# Patient Record
Sex: FEMALE | Race: WHITE | Employment: OTHER | URBAN - METROPOLITAN AREA
[De-identification: names, ages, dates, MRNs, and addresses within clinical notes are randomized per-mention and may not be internally consistent; named-entity substitution may affect disease eponyms.]

---

## 2017-04-08 ENCOUNTER — APPOINTMENT (OUTPATIENT)
Dept: CT IMAGING | Facility: HOSPITAL | Age: 72
End: 2017-04-08
Attending: EMERGENCY MEDICINE
Payer: MEDICARE

## 2017-04-08 ENCOUNTER — HOSPITAL ENCOUNTER (EMERGENCY)
Facility: HOSPITAL | Age: 72
Discharge: HOME | End: 2017-04-08
Attending: EMERGENCY MEDICINE
Payer: MEDICARE

## 2017-04-08 VITALS
HEART RATE: 78 BPM | RESPIRATION RATE: 18 BRPM | BODY MASS INDEX: 24.45 KG/M2 | TEMPERATURE: 97 F | SYSTOLIC BLOOD PRESSURE: 149 MMHG | WEIGHT: 138 LBS | HEIGHT: 63 IN | OXYGEN SATURATION: 98 % | DIASTOLIC BLOOD PRESSURE: 88 MMHG

## 2017-04-08 DIAGNOSIS — S09.90XA HEAD INJURY, INITIAL ENCOUNTER: ICD-10-CM

## 2017-04-08 DIAGNOSIS — S06.0X0A CONCUSSION, WITHOUT LOC, INITIAL ENCOUNTER: Primary | ICD-10-CM

## 2017-04-08 DIAGNOSIS — S01.01XA SCALP LACERATION, INITIAL ENCOUNTER: ICD-10-CM

## 2017-04-08 PROCEDURE — 99284 EMERGENCY DEPT VISIT MOD MDM: CPT

## 2017-04-08 PROCEDURE — 93010 ELECTROCARDIOGRAM REPORT: CPT

## 2017-04-08 PROCEDURE — 70450 CT HEAD/BRAIN W/O DYE: CPT

## 2017-04-08 PROCEDURE — 96374 THER/PROPH/DIAG INJ IV PUSH: CPT

## 2017-04-08 PROCEDURE — 93005 ELECTROCARDIOGRAM TRACING: CPT

## 2017-04-08 PROCEDURE — 96361 HYDRATE IV INFUSION ADD-ON: CPT

## 2017-04-08 RX ORDER — ONDANSETRON 4 MG/1
4 TABLET, ORALLY DISINTEGRATING ORAL EVERY 4 HOURS PRN
Qty: 10 TABLET | Refills: 0 | Status: SHIPPED | OUTPATIENT
Start: 2017-04-08 | End: 2017-04-15

## 2017-04-08 RX ORDER — ONDANSETRON 2 MG/ML
4 INJECTION INTRAMUSCULAR; INTRAVENOUS ONCE
Status: COMPLETED | OUTPATIENT
Start: 2017-04-08 | End: 2017-04-08

## 2017-04-08 NOTE — ED INITIAL ASSESSMENT (HPI)
Pt states she was kneeling on the counters washing the windows when she lost her balance and fell backwards onto the hardwood floor. Pt had no loss of conc reported but was complaining of nausea.   Pt did vomit once prior to arrival and states nausea has s

## 2017-04-08 NOTE — ED PROVIDER NOTES
Patient Seen in: [facility] Emergency Department    History   Patient presents with:  Fall (musculoskeletal, neurologic)    Stated Complaint: Fall    HPI    69-year-old female presents with a head injury.   Patient states she was standing on a kitche other systems reviewed and negative except as noted above. PSFH elements reviewed from today and agreed except as otherwise stated in HPI.     Physical Exam       ED Triage Vitals   BP 04/08/17 1450 140/83 mmHg   Pulse 04/08/17 1450 78   Resp 04/08/17 14 reduction techniques were used. Dose information is transmitted to the [facility] [employer] [facility]) NRDR ([address]) which includes the Dose Index Registry.   PATIENT STATED HISTORY: (As transcribed by Technologist)  The patien should she continue to have nausea or vomiting. Symptoms consistent with concussion. To follow-up with primary care physician. Posterior scalp laceration is superficial and does not require surgical reapproximation.   Patient instructed to return to the

## 2017-04-08 NOTE — ED NOTES
D/c instructions and script given to pt, no distress, pt requires wheelchair out of ED, thanks staff for care.

## 2017-04-08 NOTE — ED NOTES
Cervical collar removed by Dr. Adriana Beavers. Pt has no change in neuro symptoms or assessment. Pt has a 1cm laceration noted to the left lower occipital area. Pt was reporting having nausea and dizziness when sitting forward. Pt did vomit once.   And after l

## 2017-04-08 NOTE — ED NOTES
Pt received at this time awake and alert. Pt currently is denying nausea or dizziness but vomited once in the ambulance. Pt awake and alert, oriented x3. Pt remembers the event. Pt warm and dry to touch with normal coloration noted.   Pt respirations

## 2017-07-25 PROBLEM — Z28.21 REFUSED STREPTOCOCCUS PNEUMONIAE VACCINATION: Status: ACTIVE | Noted: 2017-07-25

## 2017-10-12 ENCOUNTER — HOSPITAL ENCOUNTER (OUTPATIENT)
Dept: CT IMAGING | Facility: HOSPITAL | Age: 72
Discharge: HOME | End: 2017-10-12
Attending: NURSE PRACTITIONER
Payer: MEDICARE

## 2017-10-12 DIAGNOSIS — R10.32 LLQ PAIN: ICD-10-CM

## 2017-10-12 DIAGNOSIS — R10.31 RLQ ABDOMINAL PAIN: ICD-10-CM

## 2017-10-12 PROCEDURE — 74176 CT ABD & PELVIS W/O CONTRAST: CPT | Performed by: NURSE PRACTITIONER

## 2018-01-04 ENCOUNTER — HOSPITAL ENCOUNTER (OUTPATIENT)
Dept: GENERAL RADIOLOGY | Facility: HOSPITAL | Age: 73
Discharge: HOME | End: 2018-01-04
Attending: INTERNAL MEDICINE
Payer: MEDICARE

## 2018-01-04 DIAGNOSIS — R10.32 ABDOMINAL PAIN, LEFT LOWER QUADRANT: ICD-10-CM

## 2018-01-04 PROCEDURE — 74280 X-RAY XM COLON 2CNTRST STD: CPT | Performed by: INTERNAL MEDICINE

## 2018-01-11 RX ORDER — MULTIVIT WITH MINERALS/LUTEIN
250 TABLET ORAL DAILY
COMMUNITY
End: 2019-05-14

## 2018-01-11 RX ORDER — SODIUM CHLORIDE, SODIUM LACTATE, POTASSIUM CHLORIDE, CALCIUM CHLORIDE 600; 310; 30; 20 MG/100ML; MG/100ML; MG/100ML; MG/100ML
INJECTION, SOLUTION INTRAVENOUS CONTINUOUS
Status: CANCELLED | OUTPATIENT
Start: 2018-01-11

## 2018-01-25 PROBLEM — I49.3 UNIFOCAL PVCS: Status: ACTIVE | Noted: 2018-01-25

## 2018-01-25 PROBLEM — Z12.31 ENCOUNTER FOR SCREENING MAMMOGRAM FOR BREAST CANCER: Status: ACTIVE | Noted: 2018-01-25

## 2018-01-25 PROBLEM — Z79.899 ENCOUNTER FOR LONG-TERM CURRENT USE OF MEDICATION: Status: ACTIVE | Noted: 2018-01-25

## 2018-01-25 PROBLEM — Z28.21 REFUSED INFLUENZA VACCINE: Status: ACTIVE | Noted: 2018-01-25

## 2018-01-28 ENCOUNTER — ANESTHESIA EVENT (OUTPATIENT)
Dept: SURGERY | Facility: HOSPITAL | Age: 73
DRG: 330 | End: 2018-01-28
Payer: MEDICARE

## 2018-01-29 ENCOUNTER — ANESTHESIA (OUTPATIENT)
Dept: SURGERY | Facility: HOSPITAL | Age: 73
DRG: 330 | End: 2018-01-29
Payer: MEDICARE

## 2018-01-29 ENCOUNTER — SURGERY (OUTPATIENT)
Age: 73
End: 2018-01-29

## 2018-01-29 ENCOUNTER — HOSPITAL ENCOUNTER (INPATIENT)
Facility: HOSPITAL | Age: 73
LOS: 2 days | Discharge: HOME | DRG: 330 | End: 2018-01-31
Attending: SURGERY | Admitting: SURGERY
Payer: MEDICARE

## 2018-01-29 DIAGNOSIS — K56.699 STRICTURE OF SIGMOID COLON (HCC): ICD-10-CM

## 2018-01-29 PROCEDURE — 0DTN0ZZ RESECTION OF SIGMOID COLON, OPEN APPROACH: ICD-10-PCS | Performed by: SURGERY

## 2018-01-29 PROCEDURE — 88307 TISSUE EXAM BY PATHOLOGIST: CPT | Performed by: SURGERY

## 2018-01-29 PROCEDURE — 0DJD8ZZ INSPECTION OF LOWER INTESTINAL TRACT, VIA NATURAL OR ARTIFICIAL OPENING ENDOSCOPIC: ICD-10-PCS | Performed by: SURGERY

## 2018-01-29 RX ORDER — OXYCODONE HYDROCHLORIDE 5 MG/1
2.5 TABLET ORAL EVERY 4 HOURS PRN
Status: DISCONTINUED | OUTPATIENT
Start: 2018-01-29 | End: 2018-01-31

## 2018-01-29 RX ORDER — METOCLOPRAMIDE HYDROCHLORIDE 5 MG/ML
10 INJECTION INTRAMUSCULAR; INTRAVENOUS AS NEEDED
Status: DISCONTINUED | OUTPATIENT
Start: 2018-01-29 | End: 2018-01-29 | Stop reason: HOSPADM

## 2018-01-29 RX ORDER — LIDOCAINE HYDROCHLORIDE AND EPINEPHRINE 10; 10 MG/ML; UG/ML
INJECTION, SOLUTION INFILTRATION; PERINEURAL AS NEEDED
Status: DISCONTINUED | OUTPATIENT
Start: 2018-01-29 | End: 2018-01-29 | Stop reason: HOSPADM

## 2018-01-29 RX ORDER — NALOXONE HYDROCHLORIDE 0.4 MG/ML
80 INJECTION, SOLUTION INTRAMUSCULAR; INTRAVENOUS; SUBCUTANEOUS AS NEEDED
Status: DISCONTINUED | OUTPATIENT
Start: 2018-01-29 | End: 2018-01-29 | Stop reason: HOSPADM

## 2018-01-29 RX ORDER — KETOROLAC TROMETHAMINE 30 MG/ML
30 INJECTION, SOLUTION INTRAMUSCULAR; INTRAVENOUS EVERY 6 HOURS PRN
Status: DISCONTINUED | OUTPATIENT
Start: 2018-01-29 | End: 2018-01-29 | Stop reason: DRUGHIGH

## 2018-01-29 RX ORDER — FAMOTIDINE 20 MG/1
20 TABLET ORAL DAILY
Status: DISCONTINUED | OUTPATIENT
Start: 2018-01-29 | End: 2018-01-31

## 2018-01-29 RX ORDER — METRONIDAZOLE 500 MG/100ML
500 INJECTION, SOLUTION INTRAVENOUS ONCE
Status: DISCONTINUED | OUTPATIENT
Start: 2018-01-29 | End: 2018-01-29 | Stop reason: HOSPADM

## 2018-01-29 RX ORDER — MIDAZOLAM HYDROCHLORIDE 1 MG/ML
1 INJECTION INTRAMUSCULAR; INTRAVENOUS EVERY 5 MIN PRN
Status: DISCONTINUED | OUTPATIENT
Start: 2018-01-29 | End: 2018-01-29 | Stop reason: HOSPADM

## 2018-01-29 RX ORDER — POLYETHYLENE GLYCOL 3350 17 G/17G
1 POWDER, FOR SOLUTION ORAL DAILY PRN
Status: DISCONTINUED | OUTPATIENT
Start: 2018-01-29 | End: 2018-01-31

## 2018-01-29 RX ORDER — SODIUM CHLORIDE, SODIUM LACTATE, POTASSIUM CHLORIDE, CALCIUM CHLORIDE 600; 310; 30; 20 MG/100ML; MG/100ML; MG/100ML; MG/100ML
INJECTION, SOLUTION INTRAVENOUS CONTINUOUS
Status: DISCONTINUED | OUTPATIENT
Start: 2018-01-29 | End: 2018-01-29 | Stop reason: HOSPADM

## 2018-01-29 RX ORDER — ACETAMINOPHEN 325 MG/1
650 TABLET ORAL 4 TIMES DAILY
Status: DISCONTINUED | OUTPATIENT
Start: 2018-01-29 | End: 2018-01-31

## 2018-01-29 RX ORDER — IBUPROFEN 400 MG/1
400 TABLET ORAL EVERY 6 HOURS PRN
Status: DISCONTINUED | OUTPATIENT
Start: 2018-01-29 | End: 2018-01-31

## 2018-01-29 RX ORDER — OXYCODONE HYDROCHLORIDE 5 MG/1
10 TABLET ORAL EVERY 4 HOURS PRN
Status: DISCONTINUED | OUTPATIENT
Start: 2018-01-29 | End: 2018-01-31

## 2018-01-29 RX ORDER — SODIUM PHOSPHATE, DIBASIC AND SODIUM PHOSPHATE, MONOBASIC 7; 19 G/133ML; G/133ML
1 ENEMA RECTAL ONCE AS NEEDED
Status: DISCONTINUED | OUTPATIENT
Start: 2018-01-29 | End: 2018-01-29 | Stop reason: HOSPADM

## 2018-01-29 RX ORDER — SCOLOPAMINE TRANSDERMAL SYSTEM 1 MG/1
PATCH, EXTENDED RELEASE TRANSDERMAL
Status: DISCONTINUED
Start: 2018-01-29 | End: 2018-01-31

## 2018-01-29 RX ORDER — SCOLOPAMINE TRANSDERMAL SYSTEM 1 MG/1
1 PATCH, EXTENDED RELEASE TRANSDERMAL
Status: DISCONTINUED | OUTPATIENT
Start: 2018-01-29 | End: 2018-01-29 | Stop reason: HOSPADM

## 2018-01-29 RX ORDER — HYDROCODONE BITARTRATE AND ACETAMINOPHEN 5; 325 MG/1; MG/1
2 TABLET ORAL AS NEEDED
Status: DISCONTINUED | OUTPATIENT
Start: 2018-01-29 | End: 2018-01-29 | Stop reason: HOSPADM

## 2018-01-29 RX ORDER — MEPERIDINE HYDROCHLORIDE 25 MG/ML
12.5 INJECTION INTRAMUSCULAR; INTRAVENOUS; SUBCUTANEOUS AS NEEDED
Status: DISCONTINUED | OUTPATIENT
Start: 2018-01-29 | End: 2018-01-29 | Stop reason: HOSPADM

## 2018-01-29 RX ORDER — HEPARIN SODIUM 5000 [USP'U]/ML
5000 INJECTION, SOLUTION INTRAVENOUS; SUBCUTANEOUS EVERY 12 HOURS
Status: DISCONTINUED | OUTPATIENT
Start: 2018-01-29 | End: 2018-01-31

## 2018-01-29 RX ORDER — BACITRACIN 50000 [USP'U]/1
INJECTION, POWDER, LYOPHILIZED, FOR SOLUTION INTRAMUSCULAR AS NEEDED
Status: DISCONTINUED | OUTPATIENT
Start: 2018-01-29 | End: 2018-01-29 | Stop reason: HOSPADM

## 2018-01-29 RX ORDER — HYDROMORPHONE HYDROCHLORIDE 1 MG/ML
INJECTION, SOLUTION INTRAMUSCULAR; INTRAVENOUS; SUBCUTANEOUS
Status: COMPLETED
Start: 2018-01-29 | End: 2018-01-29

## 2018-01-29 RX ORDER — HYDROCODONE BITARTRATE AND ACETAMINOPHEN 5; 325 MG/1; MG/1
1 TABLET ORAL AS NEEDED
Status: DISCONTINUED | OUTPATIENT
Start: 2018-01-29 | End: 2018-01-29 | Stop reason: HOSPADM

## 2018-01-29 RX ORDER — IBUPROFEN 600 MG/1
600 TABLET ORAL EVERY 6 HOURS PRN
Status: DISCONTINUED | OUTPATIENT
Start: 2018-01-29 | End: 2018-01-31

## 2018-01-29 RX ORDER — ONDANSETRON 2 MG/ML
4 INJECTION INTRAMUSCULAR; INTRAVENOUS EVERY 6 HOURS PRN
Status: DISCONTINUED | OUTPATIENT
Start: 2018-01-29 | End: 2018-01-31

## 2018-01-29 RX ORDER — DIPHENHYDRAMINE HYDROCHLORIDE 50 MG/ML
12.5 INJECTION INTRAMUSCULAR; INTRAVENOUS AS NEEDED
Status: DISCONTINUED | OUTPATIENT
Start: 2018-01-29 | End: 2018-01-29 | Stop reason: HOSPADM

## 2018-01-29 RX ORDER — KETOROLAC TROMETHAMINE 15 MG/ML
15 INJECTION, SOLUTION INTRAMUSCULAR; INTRAVENOUS EVERY 6 HOURS PRN
Status: DISCONTINUED | OUTPATIENT
Start: 2018-01-29 | End: 2018-01-31

## 2018-01-29 RX ORDER — BUPIVACAINE HYDROCHLORIDE 5 MG/ML
INJECTION, SOLUTION EPIDURAL; INTRACAUDAL AS NEEDED
Status: DISCONTINUED | OUTPATIENT
Start: 2018-01-29 | End: 2018-01-29 | Stop reason: HOSPADM

## 2018-01-29 RX ORDER — METRONIDAZOLE 500 MG/100ML
INJECTION, SOLUTION INTRAVENOUS
Status: DISCONTINUED | OUTPATIENT
Start: 2018-01-29 | End: 2018-01-29

## 2018-01-29 RX ORDER — OXYCODONE HYDROCHLORIDE 5 MG/1
5 TABLET ORAL EVERY 4 HOURS PRN
Status: DISCONTINUED | OUTPATIENT
Start: 2018-01-29 | End: 2018-01-31

## 2018-01-29 RX ORDER — ACETAMINOPHEN 500 MG
1000 TABLET ORAL ONCE
Status: COMPLETED | OUTPATIENT
Start: 2018-01-29 | End: 2018-01-29

## 2018-01-29 RX ORDER — ZOLPIDEM TARTRATE 5 MG/1
5 TABLET ORAL NIGHTLY PRN
Status: DISCONTINUED | OUTPATIENT
Start: 2018-01-29 | End: 2018-01-31

## 2018-01-29 RX ORDER — FAMOTIDINE 10 MG/ML
20 INJECTION, SOLUTION INTRAVENOUS DAILY
Status: DISCONTINUED | OUTPATIENT
Start: 2018-01-29 | End: 2018-01-31

## 2018-01-29 RX ORDER — SODIUM CHLORIDE 9 MG/ML
INJECTION, SOLUTION INTRAVENOUS CONTINUOUS
Status: DISCONTINUED | OUTPATIENT
Start: 2018-01-29 | End: 2018-01-29

## 2018-01-29 RX ORDER — SODIUM CHLORIDE 9 MG/ML
INJECTION, SOLUTION INTRAVENOUS CONTINUOUS
Status: DISCONTINUED | OUTPATIENT
Start: 2018-01-29 | End: 2018-01-31

## 2018-01-29 RX ORDER — HYDROMORPHONE HYDROCHLORIDE 1 MG/ML
0.4 INJECTION, SOLUTION INTRAMUSCULAR; INTRAVENOUS; SUBCUTANEOUS EVERY 5 MIN PRN
Status: DISCONTINUED | OUTPATIENT
Start: 2018-01-29 | End: 2018-01-29 | Stop reason: HOSPADM

## 2018-01-29 RX ORDER — ERGOCALCIFEROL (VITAMIN D2) 1250 MCG
50000 CAPSULE ORAL WEEKLY
COMMUNITY
End: 2018-03-05

## 2018-01-29 RX ORDER — ONDANSETRON 2 MG/ML
4 INJECTION INTRAMUSCULAR; INTRAVENOUS AS NEEDED
Status: DISCONTINUED | OUTPATIENT
Start: 2018-01-29 | End: 2018-01-29 | Stop reason: HOSPADM

## 2018-01-29 RX ORDER — HYDROMORPHONE HYDROCHLORIDE 1 MG/ML
0.5 INJECTION, SOLUTION INTRAMUSCULAR; INTRAVENOUS; SUBCUTANEOUS EVERY 30 MIN PRN
Status: DISCONTINUED | OUTPATIENT
Start: 2018-01-29 | End: 2018-01-31

## 2018-01-29 NOTE — ANESTHESIA PREPROCEDURE EVALUATION
PRE-OP EVALUATION    Patient Name: Shaw Daly    Pre-op Diagnosis: Stricture of sigmoid colon [K56.699]    Procedure(s):  LAPAROSCOPIC SIGMOID COLON RESECTION, POSSIBLE OPEN    Surgeon(s) and Role:     Dereck Brunson MD - Primary    Pre-op vitals Pulmonary    Negative pulmonary ROS.                        Neuro/Psych          (+) CVA and no residual deficits                         Past Surgical History:  No date: SINUS SURGERY        Comment: Nasal Septal repair   07/26/2017: SKIN SURGERY      Co

## 2018-01-29 NOTE — INTERVAL H&P NOTE
Pre-op Diagnosis: Stricture of sigmoid colon [K56.699]    The above referenced H&P was reviewed by Nelida Spain MD on 1/29/2018, the patient was examined and no significant changes have occurred in the patient's condition since the H&P was performed.   I

## 2018-01-29 NOTE — OPERATIVE REPORT
[facility]    PATIENT'S NAME: Golden Knife   ATTENDING PHYSICIAN: Waqar Jackson M.D. OPERATING PHYSICIAN: Waqar Jackson M.D.    PATIENT ACCOUNT#:   [de-identified]    LOCATION:  PACU [facility] PACU 5 EDWP 10  MEDICAL RECORD #:   [id number]       DATE OF transected it with a RACHEL stapler. I divided the mesentery to the sigmoid using the LigaSure. I removed the Anitra trocar, lengthened that incision, put in a small wound protector, and delivered the specimen through this.   I then divided the specimen prox

## 2018-01-29 NOTE — PROGRESS NOTES
[facility] Pharmacy Note:  Age Based Dose Adjustment    Tova Mallory has been prescribed ketorolac (TORADOL) 15-30 mg IV every 6 hours prn pain. Patient is >71 years old therefore the dose has been adjusted to 15 mg IV every 6 hours prn pain.       Thank you,

## 2018-01-29 NOTE — ANESTHESIA POSTPROCEDURE EVALUATION
[facility] Patient Status:  Surgery Admit   Age/Gender 67year old female MRN [id number]   [facility] SURGERY Attending Aquilino Lott, [address] [facility] [address] Day # 0 PCP Popeye Steward MD       Anesthesia Post-op Note    Procedure

## 2018-01-29 NOTE — PROGRESS NOTES
[facility] Pharmacy Note:  Renal Dose Adjustment    Navid Steele has been prescribed famotidine (PEPCID) 20 mg intravenously and orally every 12 hours. CrCl cannot be calculated (Patient's most recent lab result is older than the maximum 7 days allowed. ).

## 2018-01-29 NOTE — BRIEF OP NOTE
Pre-Operative Diagnosis: Stricture of sigmoid colon [K56.699]     Post-Operative Diagnosis: Stricture of sigmoid colon [K56.699]     Procedure Performed:   Procedure(s):  LAPAROSCOPIC SIGMOID COLON RESECTION,mobilization of splenic flexure  protoscopy

## 2018-01-29 NOTE — H&P (VIEW-ONLY)
1/14/2018    Patient presents with:  Consult: REF:Dr. Demi Alvarez  diverticulosis with imaging      HPI:    Bisi Rivera is a 67year old female who presents for evaluation of a sigmoid colon stricture  She has had episodes of diverticulitis  Recent Alcohol use: Yes           1.5 oz/week     Standard drinks or equivalent: 3 per week     Comment: occ      ROS:    10 point review performed with pertinent positives and negatives per HPI    EXAM:    GENERAL: well developed, well nourished female, in n a sequela of  diverticular disease with scarring. Post evacuation image demonstrates residual contrast throughout the colon. CONCLUSION:  1.  Short segment of narrowing with tethering of the sigmoid colon may be a sequela of chronic diverticular dise

## 2018-01-30 RX ORDER — SODIUM CHLORIDE 9 MG/ML
INJECTION, SOLUTION INTRAVENOUS CONTINUOUS
Status: DISCONTINUED | OUTPATIENT
Start: 2018-01-30 | End: 2018-01-31

## 2018-01-30 RX ORDER — SODIUM CHLORIDE 9 MG/ML
INJECTION, SOLUTION INTRAVENOUS ONCE
Status: COMPLETED | OUTPATIENT
Start: 2018-01-30 | End: 2018-01-30

## 2018-01-30 RX ORDER — PRAVASTATIN SODIUM 10 MG
10 TABLET ORAL NIGHTLY
Status: DISCONTINUED | OUTPATIENT
Start: 2018-01-30 | End: 2018-01-31

## 2018-01-30 NOTE — PROGRESS NOTES
[facility] Hospitalist Progress Note                                                                   [facility]  10/25/1945    SUBJECTIVE:  No acute events. + flatus. Pain under control.

## 2018-01-30 NOTE — CONSULTS
[facility] Hospitalist History and Physical      CC;  Medical management     PCP: Luis Russell MD      History of Present Illness: Patient is a 67year old female with PMH sig for HTN, prior CVA, presents for eval sp lap colon resection for sigmoid stricture re trachea midline, no cervical or supraclavicular lymph adenopathy, thyroid: no enlargment/tenderness/nodules appreciated   Lungs:   Clear to auscultation bilaterally. Normal effort   Chest wall:  No tenderness or deformity.    Heart:  Regular rate and rhythm images where there is approximately 50% decreased caliber of the lumen. Tethering of the sigmoid may be a sequela of  diverticular disease with scarring. Post evacuation image demonstrates residual contrast throughout the colon. CONCLUSION:  1.  [employer]

## 2018-01-30 NOTE — PROGRESS NOTES
[facility]  Progress Note    [facility] Patient Status:  Inpatient    10/25/1945 MRN [id number]   [facility] 3NW-A Attending Demian Morrison MD   [facility] Day # 1 PCP Juan M Jorgensen MD     Subjective:    Patient reports pain controll

## 2018-01-31 VITALS
WEIGHT: 145 LBS | DIASTOLIC BLOOD PRESSURE: 64 MMHG | TEMPERATURE: 99 F | OXYGEN SATURATION: 96 % | BODY MASS INDEX: 26.02 KG/M2 | HEART RATE: 62 BPM | HEIGHT: 62.5 IN | RESPIRATION RATE: 18 BRPM | SYSTOLIC BLOOD PRESSURE: 146 MMHG

## 2018-01-31 RX ORDER — HYDROCODONE BITARTRATE AND ACETAMINOPHEN 5; 325 MG/1; MG/1
1 TABLET ORAL EVERY 4 HOURS PRN
Qty: 30 TABLET | Refills: 0 | Status: SHIPPED | OUTPATIENT
Start: 2018-01-31 | End: 2018-02-10

## 2018-01-31 NOTE — PLAN OF CARE
Patient had low UO, SL. RN encouraged patient to drink fluids, bladder scan 100cc's. RN called Dr. Garcia Kinds office - on hold >10 minutes, sent text page to Dr. Heidy Silva. Waiting for call back.

## 2018-01-31 NOTE — PROGRESS NOTES
[facility]  Progress Note    Shantanu Rodrigez Patient Status:  Inpatient    10/25/1945 MRN [id number]   [facility] 3NW-A Attending Luc Martinez MD   [address] Day # 2 PCP Roosevelt Baig MD     Subjective:    Patient tolerating full liqui

## 2018-01-31 NOTE — PLAN OF CARE
Patients IV d/c'd, catheter intact. All discharge instructions explained, all questions answered. Patient will be discharged via wheelchair and support staff.

## 2018-01-31 NOTE — CM/SW NOTE
01/31/18 1000   CM/SW Screening   Referral Source Physician;Social Work (self-referral)   Information Source [facility] staff;Nursing rounds   Patient's Mental Status Alert;Oriented       Patient's post d/c needs discussed in care rounds with Charge Rn, MSW, C

## 2018-01-31 NOTE — PROGRESS NOTES
[facility] Hospitalist Progress Note                                                                   [facility]  10/25/1945    SUBJECTIVE:  No acute events. + flatus. Pain under control.

## 2018-01-31 NOTE — PLAN OF CARE
Patient ambulating halls. Would like to try to urinate after walking and eating dinner. Denies any urge, fullness or discomfort. Bladder scan <100. Will continue to monitor.

## 2018-02-01 NOTE — DISCHARGE SUMMARY
[facility]  Discharge Summary    Stacy Acuña Patient Status:  Inpatient    10/25/1945 MRN [id number]   [facility] 3NW-A Attending No att. providers found   Hosp Day # 2 PCP Donis Hammans, MD     Date of Admission: 2018    Yoshi these medications    HYDROcodone-acetaminophen 5-325 MG Oral Tab  Take 1 tablet by mouth every 4 (four) hours as needed. , Print, Disp-30 tablet, R-0      CONTINUE these medications which have NOT CHANGED    ergocalciferol 01622 units Oral Cap  Take 50,000

## 2018-06-18 PROBLEM — S93.402D MILD ANKLE SPRAIN, LEFT, SUBSEQUENT ENCOUNTER: Status: ACTIVE | Noted: 2018-06-18

## 2019-05-14 PROBLEM — I49.3 PVC (PREMATURE VENTRICULAR CONTRACTION): Status: ACTIVE | Noted: 2018-01-25

## 2019-05-14 PROBLEM — Z28.21 REFUSED INFLUENZA VACCINE: Status: RESOLVED | Noted: 2018-01-25 | Resolved: 2019-05-14

## 2019-05-14 PROBLEM — Z79.899 ENCOUNTER FOR LONG-TERM CURRENT USE OF MEDICATION: Status: RESOLVED | Noted: 2018-01-25 | Resolved: 2019-05-14

## 2019-05-14 PROBLEM — S93.402D MILD ANKLE SPRAIN, LEFT, SUBSEQUENT ENCOUNTER: Status: RESOLVED | Noted: 2018-06-18 | Resolved: 2019-05-14

## 2019-05-14 PROBLEM — Z28.21 REFUSED STREPTOCOCCUS PNEUMONIAE VACCINATION: Status: RESOLVED | Noted: 2017-07-25 | Resolved: 2019-05-14

## 2019-05-14 PROBLEM — Z12.31 ENCOUNTER FOR SCREENING MAMMOGRAM FOR BREAST CANCER: Status: RESOLVED | Noted: 2018-01-25 | Resolved: 2019-05-14

## 2019-09-30 PROCEDURE — 87086 URINE CULTURE/COLONY COUNT: CPT | Performed by: EMERGENCY MEDICINE

## (undated) DEVICE — DRESSING OPTIFOAM AG 3.5X6

## (undated) DEVICE — 3M™ TEGADERM™ TRANSPARENT FILM DRESSING, 1626W, 4 IN X 4-3/4 IN (10 CM X 12 CM), 50 EACH/CARTON, 4 CARTON/CASE: Brand: 3M™ TEGADERM™

## (undated) DEVICE — ACCESS PLATFORM FOR MINIMALLY INVASIVE SURGERY: Brand: GELPOINT®  MINI ADVANCED ACCESS PLATFORM

## (undated) DEVICE — TROCAR: Brand: KII® SLEEVE

## (undated) DEVICE — TROCAR: Brand: KII FIOS FIRST ENTRY

## (undated) DEVICE — SPONGE STICK WITH PVP-I: Brand: KENDALL

## (undated) DEVICE — TISSUE FUSION LAPAROSCOPIC INSTRUMENT: Brand: LIGASURE ATLAS

## (undated) DEVICE — PAD SACRAL SPAN AID

## (undated) DEVICE — SOL  .9 1000ML BTL

## (undated) DEVICE — UNDYED BRAIDED (POLYGLACTIN 910), SYNTHETIC ABSORBABLE SUTURE: Brand: COATED VICRYL

## (undated) DEVICE — GLOVE SURG SENSICARE SZ 7

## (undated) DEVICE — 40580 - THE PINK PAD - ADVANCED TRENDELENBURG POSITIONING KIT: Brand: 40580 - THE PINK PAD - ADVANCED TRENDELENBURG POSITIONING KIT

## (undated) DEVICE — STAPLER CIRCULAR ENDO 29MM

## (undated) DEVICE — CLOSING BUNDLE: Brand: MEDLINE INDUSTRIES, INC.

## (undated) DEVICE — 3M(TM) TEGADERM(TM) TRANSPARENT FILM DRESSING FRAME STYLE 9505W: Brand: 3M™ TEGADERM™

## (undated) DEVICE — SUTURE VICRYL 0

## (undated) DEVICE — SUTURE SILK 0

## (undated) DEVICE — SUTURE PROLENE 2-0 SH

## (undated) DEVICE — GLOVE ORTHO ALOETOUCH SZ 8-1/2

## (undated) DEVICE — GOWN,SIRUS,FABRIC-REINFORCED,X-LARGE: Brand: MEDLINE

## (undated) DEVICE — LAP COLON CDS: Brand: MEDLINE INDUSTRIES, INC.

## (undated) DEVICE — PROXIMATE SKIN STAPLERS (35 WIDE) CONTAINS 35 STAINLESS STEEL STAPLES (FIXED HEAD): Brand: PROXIMATE

## (undated) DEVICE — SUTURE PDS LOOPED 60CM

## (undated) DEVICE — TROCARS: Brand: KII® BALLOON BLUNT TIP SYSTEM

## (undated) DEVICE — CHLORAPREP 26ML APPLICATOR

## (undated) DEVICE — ECHELON FLEX POWERED PLUS ARTICULATING ENDOSCOPIC LINEAR CUTTER , 60MM: Brand: ECHELON FLEX

## (undated) DEVICE — DISSECTOR SONICISION CORDLESS

## (undated) DEVICE — KENDALL SCD EXPRESS SLEEVES, KNEE LENGTH, MEDIUM: Brand: KENDALL SCD

## (undated) DEVICE — ENDOPATH ECHELON ENDOSCOPIC LINEAR CUTTER RELOADS, BLUE, 60MM: Brand: ECHELON ENDOPATH

## (undated) DEVICE — VIOLET BRAIDED (POLYGLACTIN 910), SYNTHETIC ABSORBABLE SUTURE: Brand: COATED VICRYL

## (undated) NOTE — ED AVS SNAPSHOT
[facility] Emergency Department    [place]  [address] [address]    Phone:  [phone]    Fax:  [phone]           Jeanne Tovar   MRN: [id number]    Department:  [facility] Emergency Department   Date of Visit:  4/8/2 IF THERE IS ANY CHANGE OR WORSENING OF YOUR CONDITION, CALL YOUR PRIMARY CARE PHYSICIAN AT ONCE OR RETURN IMMEDIATELY TO THE EMERGENCY DEPARTMENT.     If you have been prescribed any medication(s), please fill your prescription right away and begin taking t

## (undated) NOTE — ED AVS SNAPSHOT
[facility] Emergency Department    [place]  [address] [address]    Phone:  [phone]    Fax:  [phone]           Sarah Tommy   MRN: [id number]    Department:  [facility] Emergency Department   Date of Visit:  4/8/2 Where to Get Your Medications      You can get these medications from any pharmacy     Bring a paper prescription for each of these medications    - ondansetron 4 MG Tbdp            Discharge References/Attachments     CONCUSSION (ENGLISH)    WOUND CARE (E IF THERE IS ANY CHANGE OR WORSENING OF YOUR CONDITION, CALL YOUR PRIMARY CARE PHYSICIAN AT ONCE OR RETURN IMMEDIATELY TO THE EMERGENCY DEPARTMENT.     If you have been prescribed any medication(s), please fill your prescription right away and begin taking t Patient [address] to help you get signed up for insurance coverage. Patient [address] is a Federal Navigator program that can help with your Affordable Care Act coverage, as well as all types of Medicaid plans.   To get signed up and covere hemorrhage that was demonstrated on examination from 2010 is noted. No evidence of an acute intracranial   abnormality.          Dictated by: Joshua Wong MD on 4/08/2017 at 15:40       Approved by: Joshua Wong MD                    [facility]